# Patient Record
Sex: FEMALE | Race: ASIAN | NOT HISPANIC OR LATINO | URBAN - METROPOLITAN AREA
[De-identification: names, ages, dates, MRNs, and addresses within clinical notes are randomized per-mention and may not be internally consistent; named-entity substitution may affect disease eponyms.]

---

## 2017-03-01 ENCOUNTER — IMPORTED ENCOUNTER (OUTPATIENT)
Dept: URBAN - METROPOLITAN AREA CLINIC 50 | Facility: CLINIC | Age: 52
End: 2017-03-01

## 2017-03-14 ENCOUNTER — IMPORTED ENCOUNTER (OUTPATIENT)
Dept: URBAN - METROPOLITAN AREA CLINIC 50 | Facility: CLINIC | Age: 52
End: 2017-03-14

## 2017-03-15 ENCOUNTER — IMPORTED ENCOUNTER (OUTPATIENT)
Dept: URBAN - METROPOLITAN AREA CLINIC 50 | Facility: CLINIC | Age: 52
End: 2017-03-15

## 2017-03-30 ENCOUNTER — IMPORTED ENCOUNTER (OUTPATIENT)
Dept: URBAN - METROPOLITAN AREA CLINIC 50 | Facility: CLINIC | Age: 52
End: 2017-03-30

## 2017-04-28 ENCOUNTER — IMPORTED ENCOUNTER (OUTPATIENT)
Dept: URBAN - METROPOLITAN AREA CLINIC 50 | Facility: CLINIC | Age: 52
End: 2017-04-28

## 2021-04-17 ASSESSMENT — KERATOMETRY
OD_K2POWER_DIOPTERS: 46.00
OS_K2POWER_DIOPTERS: 46.00
OD_AXISANGLE2_DEGREES: 100
OD_K1POWER_DIOPTERS: 45.50
OS_AXISANGLE2_DEGREES: 65
OS_K1POWER_DIOPTERS: 45.25

## 2021-04-17 ASSESSMENT — VISUAL ACUITY
OD_BAT: 20/30
OS_BAT: 20/30
OD_OTHER: 20/30. 20/50.
OD_CC: 20/30
OS_CC: 20/50

## 2021-04-17 ASSESSMENT — TONOMETRY
OS_IOP_MMHG: 17
OD_IOP_MMHG: 18

## 2021-05-26 ENCOUNTER — PREPPED CHART (OUTPATIENT)
Dept: URBAN - METROPOLITAN AREA CLINIC 50 | Facility: CLINIC | Age: 56
End: 2021-05-26

## 2021-05-26 ASSESSMENT — VISUAL ACUITY
OD_GLARE: 20/30
OD_CC: 20/30
OS_GLARE: 20/150
OS_CC: J1
OS_CC: 20/50
OD_CC: J1
OS_GLARE: 20/30
OD_GLARE: 20/50

## 2021-05-26 ASSESSMENT — KERATOMETRY
OD_AXISANGLE2_DEGREES: 88
OS_K2POWER_DIOPTERS: 46.25
OD_AXISANGLE_DEGREES: 178
OS_AXISANGLE_DEGREES: 158
OD_K1POWER_DIOPTERS: 45.75
OS_K1POWER_DIOPTERS: 45.25
OD_K2POWER_DIOPTERS: 46.50
OS_AXISANGLE2_DEGREES: 68

## 2021-05-27 ENCOUNTER — NEW PATIENT ROUTINE/VISION (OUTPATIENT)
Dept: URBAN - METROPOLITAN AREA CLINIC 50 | Facility: CLINIC | Age: 56
End: 2021-05-27

## 2021-05-27 DIAGNOSIS — H52.4: ICD-10-CM

## 2021-05-27 DIAGNOSIS — Z01.00: ICD-10-CM

## 2021-05-27 PROCEDURE — 92015 DETERMINE REFRACTIVE STATE: CPT

## 2021-05-27 PROCEDURE — 92004 COMPRE OPH EXAM NEW PT 1/>: CPT

## 2021-05-27 RX ORDER — LATANOPROST 50 UG/ML
1 SOLUTION/ DROPS OPHTHALMIC EVERY EVENING
Start: 2021-05-27

## 2021-05-27 ASSESSMENT — KERATOMETRY
OS_AXISANGLE2_DEGREES: 153
OS_K1POWER_DIOPTERS: 45.75
OD_AXISANGLE2_DEGREES: 180
OD_K1POWER_DIOPTERS: 46.50
OD_AXISANGLE_DEGREES: 090
OS_AXISANGLE_DEGREES: 063
OD_K2POWER_DIOPTERS: 45.75
OS_K2POWER_DIOPTERS: 45.25

## 2021-05-27 ASSESSMENT — VISUAL ACUITY
OS_PH: 20/30
OD_CC: J1+
OU_CC: J1+
OD_SC: 20/200
OD_CC: 20/40
OS_SC: 20/800
OD_SC: 20/800
OS_CC: J1+
OS_CC: 20/40
OU_SC: 20/400
OS_SC: 20/200

## 2021-05-27 ASSESSMENT — TONOMETRY
OD_IOP_MMHG: 24
OS_IOP_MMHG: 20

## 2021-05-27 NOTE — PATIENT DISCUSSION
Elevated IOP today. She states she was diagnosed in 2013 by another office. She was never started on IOP lowering drops at that time. She does have a family history of glaucoma with father, sister and brother having it. Will start Latanoprost both eyes at night. Will order HVF, RNFL and pachy prior to next visit. Will do gonio at next visit and IOP check.

## 2021-06-07 ENCOUNTER — DIAGNOSTICS ONLY (OUTPATIENT)
Dept: URBAN - METROPOLITAN AREA CLINIC 50 | Facility: CLINIC | Age: 56
End: 2021-06-07

## 2021-06-07 DIAGNOSIS — H40.023: ICD-10-CM

## 2021-06-07 PROCEDURE — 76514 ECHO EXAM OF EYE THICKNESS: CPT

## 2021-06-07 PROCEDURE — 92083 EXTENDED VISUAL FIELD XM: CPT

## 2021-06-07 PROCEDURE — 92133 CPTRZD OPH DX IMG PST SGM ON: CPT

## 2021-06-07 ASSESSMENT — KERATOMETRY
OD_AXISANGLE2_DEGREES: 180
OS_K2POWER_DIOPTERS: 45.25
OS_AXISANGLE2_DEGREES: 153
OD_K1POWER_DIOPTERS: 46.50
OS_AXISANGLE_DEGREES: 063
OD_K2POWER_DIOPTERS: 45.75
OD_AXISANGLE_DEGREES: 090
OS_K1POWER_DIOPTERS: 45.75